# Patient Record
Sex: MALE | Race: BLACK OR AFRICAN AMERICAN | Employment: STUDENT | URBAN - NONMETROPOLITAN AREA
[De-identification: names, ages, dates, MRNs, and addresses within clinical notes are randomized per-mention and may not be internally consistent; named-entity substitution may affect disease eponyms.]

---

## 2024-01-10 ENCOUNTER — HOSPITAL ENCOUNTER (EMERGENCY)
Age: 24
Discharge: HOME | End: 2024-01-10
Payer: MEDICAID

## 2024-01-10 VITALS
RESPIRATION RATE: 20 BRPM | HEART RATE: 74 BPM | DIASTOLIC BLOOD PRESSURE: 89 MMHG | TEMPERATURE: 97.9 F | OXYGEN SATURATION: 100 % | SYSTOLIC BLOOD PRESSURE: 128 MMHG

## 2024-01-10 DIAGNOSIS — K21.9 GASTROESOPHAGEAL REFLUX DISEASE WITHOUT ESOPHAGITIS: Primary | ICD-10-CM

## 2024-01-10 PROCEDURE — 99202 OFFICE O/P NEW SF 15 MIN: CPT

## 2024-01-10 ASSESSMENT — PAIN - FUNCTIONAL ASSESSMENT: PAIN_FUNCTIONAL_ASSESSMENT: NONE - DENIES PAIN

## 2024-01-10 NOTE — ED TRIAGE NOTES
Pt to UC with c/o heartburn last night. Pt reports burning in his upper abdomen last night after eating food he doesn't normally eat. Pt reports he is a healthy eater but lately he was been eating foods he doesn't normally eat. Pt reports burning went away and has not experienced it today. Pt also reports no BM in 2 days. Pt reports usually being regular.

## 2024-01-10 NOTE — ED PROVIDER NOTES
[facility] URGENT CARE  Urgent Care Encounter       CHIEF COMPLAINT       Chief Complaint   Patient presents with    Heartburn    Constipation       Nurses Notes reviewed and I agree except as noted in the HPI.  HISTORY OF PRESENT ILLNESS   Kunal Greene is a 23 y.o. male who presents with complaints of heartburn, and constipation.  Patient reports that he is here from [place] visiting his girlfriend.  Patient reports that normally he eats salmon, fruits, vegetables, and salads.  Patient reports that since he has been here he has been eating greasy food, quesadilla and steak. Pt reports last night he started having pain near his chest and was concerned. Pt reports bowel movement was 2 days ago.     The history is provided by the patient.       REVIEW OF SYSTEMS     Review of Systems   Gastrointestinal:         Heart burn   All other systems reviewed and are negative.      PAST MEDICAL HISTORY   History reviewed. No pertinent past medical history.    SURGICALHISTORY     Patient  has no past surgical history on file.    CURRENT MEDICATIONS       Previous Medications    No medications on file       ALLERGIES     Patient is has No Known Allergies.    Patients   There is no immunization history on file for this patient.    FAMILY HISTORY     Patient's family history is not on file.    SOCIAL HISTORY     Patient      PHYSICAL EXAM     ED TRIAGE VITALS  BP: 128/89, Temp: 97.9 °F (36.6 °C), Pulse: 74, Respirations: 20, SpO2: 100 %,There is no height or weight on file to calculate BMI.,No LMP for male patient.    Physical Exam  Vitals and nursing note reviewed.   Constitutional:       Appearance: Normal appearance. He is normal weight.   HENT:      Nose: No congestion.   Cardiovascular:      Rate and Rhythm: Normal rate and regular rhythm.      Heart sounds: Normal heart sounds.   Pulmonary:      Effort: Pulmonary effort is normal.      Breath sounds: Normal breath sounds.   Skin:     General: Skin is warm and  is flat. Bowel sounds are normal.      Palpations: Abdomen is soft.      Tenderness: There is no abdominal tenderness.   Skin:     General: Skin is warm and dry.      Capillary Refill: Capillary refill takes less than 2 seconds.   Neurological:      General: No focal deficit present.      Mental Status: He is alert and oriented to person, place, and time.         DIAGNOSTIC RESULTS     Labs:No results found for this visit on 01/10/24.    IMAGING:    No orders to display         EKG:      URGENT CARE COURSE:     Vitals:    01/10/24 1426   BP: 128/89   Pulse: 74   Resp: 20   Temp: 97.9 °F (36.6 °C)   SpO2: 100%       Medications - No data to display         PROCEDURES:  None    FINAL IMPRESSION      1. Gastroesophageal reflux disease without esophagitis          DISPOSITION/ PLAN     Patient diagnosed with reflux.  Patient educated to stop eating the foods that he is eating, and go back to normal diet.  Drink lots of fluids, avoid eating near bedtime.  Follow-up with family doctor as needed.  Return for worsening symptoms.      PATIENT REFERRED TO:  No primary care provider on file.  No primary physician on file.      DISCHARGE MEDICATIONS:  There are no discharge medications for this patient.      There are no discharge medications for this patient.      There are no discharge medications for this patient.      CAROL Ray CNP    (Please note that portions of this note were completed with a voice recognition program. Efforts were made to edit the dictations but occasionally words are mis-transcribed.)            Tasha Jiménez APRN - CNP  01/10/24 1442       Tasha Jiménez APRN - CNP  01/10/24 1442

## 2025-05-19 ENCOUNTER — HOSPITAL ENCOUNTER (EMERGENCY)
Age: 25
Discharge: HOME | End: 2025-05-19
Payer: MEDICAID

## 2025-05-19 VITALS
WEIGHT: 155 LBS | TEMPERATURE: 98.4 F | HEART RATE: 69 BPM | SYSTOLIC BLOOD PRESSURE: 122 MMHG | OXYGEN SATURATION: 98 % | RESPIRATION RATE: 16 BRPM | HEIGHT: 67 IN | DIASTOLIC BLOOD PRESSURE: 81 MMHG | BODY MASS INDEX: 24.33 KG/M2

## 2025-05-19 DIAGNOSIS — J02.9 VIRAL PHARYNGITIS: Primary | ICD-10-CM

## 2025-05-19 LAB — S PYO AG THROAT QL: NEGATIVE

## 2025-05-19 PROCEDURE — 99213 OFFICE O/P EST LOW 20 MIN: CPT

## 2025-05-19 PROCEDURE — 87651 STREP A DNA AMP PROBE: CPT

## 2025-05-19 RX ORDER — PREDNISONE 20 MG/1
20 TABLET ORAL 2 TIMES DAILY
Qty: 10 TABLET | Refills: 0 | Status: SHIPPED | OUTPATIENT
Start: 2025-05-19 | End: 2025-05-24

## 2025-05-19 ASSESSMENT — ENCOUNTER SYMPTOMS
COUGH: 0
SORE THROAT: 1

## 2025-05-19 NOTE — ED PROVIDER NOTES
[facility] URGENT CARE  Urgent Care Encounter      CHIEF COMPLAINT       Chief Complaint   Patient presents with    Pharyngitis       Nurses Notes reviewed and I agree except as noted in the HPI.  HISTORY OF PRESENT ILLNESS   Kunal Greene is a 24 y.o. male who presents to urgent care with complaints of sore throat.  Patient reports symptoms started 4 days ago.  Denies fevers, shortness of breath, chest pain.  Patient reports he has been taking Tylenol and DayQuil at home.  Reports he has been gargling with salt water as well.  Denies being around anyone sick recently that he is aware of.  Patient is requesting strep testing.    REVIEW OF SYSTEMS     Review of Systems   Constitutional:  Negative for fever.   HENT:  Positive for congestion and sore throat.    Respiratory:  Negative for cough.    Cardiovascular:  Negative for chest pain.   Neurological:  Negative for seizures.       PAST MEDICAL HISTORY   History reviewed. No pertinent past medical history.    SURGICAL HISTORY     Patient  has no past surgical history on file.    CURRENT MEDICATIONS       Previous Medications    No medications on file       ALLERGIES     Patient is has no known allergies.    FAMILY HISTORY     Patient'sfamily history is not on file.    SOCIAL HISTORY     Patient      PHYSICAL EXAM     ED TRIAGE VITALS  BP: 122/81, Temp: 98.4 °F (36.9 °C), Pulse: 69, Respirations: 16, SpO2: 98 %  Physical Exam  Vitals and nursing note reviewed.   Constitutional:       General: He is not in acute distress.     Appearance: He is well-developed and normal weight. He is not ill-appearing or diaphoretic.   HENT:      Head: Normocephalic and atraumatic.      Mouth/Throat:      Mouth: Mucous membranes are moist.      Pharynx: Posterior oropharyngeal erythema present.      Tonsils: No tonsillar exudate or tonsillar abscesses. 3+ on the right. 2+ on the left.   Cardiovascular:      Rate and Rhythm: Normal rate.   Pulmonary:      Effort: Pulmonary effort is

## 2025-05-19 NOTE — ED TRIAGE NOTES
Patient ambulatory to room 1 for complaint of sore throat with visible white patches.  Patient reports symptoms x 4 days, denies fever but reports headache.  Patient also reports occasional chills.

## 2025-05-19 NOTE — DISCHARGE INSTRUCTIONS
Increase water intake, frequent hand washing.  Continue gargling warm salt water, chloraseptic spray, throat lozenges  Tylenol / Ibuprofen as needed for fever and or pain.  Follow up with PCP in 3-5 days if no improvement or sooner with worsening symptoms.